# Patient Record
Sex: FEMALE | Race: WHITE | URBAN - METROPOLITAN AREA
[De-identification: names, ages, dates, MRNs, and addresses within clinical notes are randomized per-mention and may not be internally consistent; named-entity substitution may affect disease eponyms.]

---

## 2017-08-31 ENCOUNTER — OFFICE VISIT (OUTPATIENT)
Dept: DERMATOLOGY | Age: 67
End: 2017-08-31

## 2017-08-31 DIAGNOSIS — D22.9 MULTIPLE NEVI: ICD-10-CM

## 2017-08-31 DIAGNOSIS — Z85.828 HISTORY OF SKIN CANCER: Primary | ICD-10-CM

## 2017-08-31 DIAGNOSIS — L57.0 AK (ACTINIC KERATOSIS): ICD-10-CM

## 2017-08-31 DIAGNOSIS — L82.1 SEBORRHEIC KERATOSIS: ICD-10-CM

## 2017-08-31 PROCEDURE — G8421 BMI NOT CALCULATED: HCPCS | Performed by: DERMATOLOGY

## 2017-08-31 PROCEDURE — 17000 DESTRUCT PREMALG LESION: CPT | Performed by: DERMATOLOGY

## 2017-08-31 PROCEDURE — 4040F PNEUMOC VAC/ADMIN/RCVD: CPT | Performed by: DERMATOLOGY

## 2017-08-31 PROCEDURE — 3014F SCREEN MAMMO DOC REV: CPT | Performed by: DERMATOLOGY

## 2017-08-31 PROCEDURE — 1090F PRES/ABSN URINE INCON ASSESS: CPT | Performed by: DERMATOLOGY

## 2017-08-31 PROCEDURE — 99213 OFFICE O/P EST LOW 20 MIN: CPT | Performed by: DERMATOLOGY

## 2017-08-31 PROCEDURE — G8400 PT W/DXA NO RESULTS DOC: HCPCS | Performed by: DERMATOLOGY

## 2017-08-31 PROCEDURE — 1123F ACP DISCUSS/DSCN MKR DOCD: CPT | Performed by: DERMATOLOGY

## 2017-08-31 PROCEDURE — 1036F TOBACCO NON-USER: CPT | Performed by: DERMATOLOGY

## 2017-08-31 PROCEDURE — G8427 DOCREV CUR MEDS BY ELIG CLIN: HCPCS | Performed by: DERMATOLOGY

## 2017-08-31 PROCEDURE — 3017F COLORECTAL CA SCREEN DOC REV: CPT | Performed by: DERMATOLOGY

## 2017-11-02 ENCOUNTER — HOSPITAL ENCOUNTER (OUTPATIENT)
Dept: ENDOSCOPY | Age: 67
Discharge: HOME | End: 2017-11-02
Attending: INTERNAL MEDICINE | Admitting: INTERNAL MEDICINE

## 2017-11-02 RX ORDER — SODIUM CHLORIDE 9 MG/ML
INJECTION, SOLUTION INTRAVENOUS CONTINUOUS
Status: DISCONTINUED | OUTPATIENT
Start: 2017-11-02 | End: 2017-11-03 | Stop reason: HOSPADM

## 2017-11-02 RX ORDER — SODIUM CHLORIDE 0.9 % (FLUSH) 0.9 %
10 SYRINGE (ML) INJECTION EVERY 12 HOURS SCHEDULED
Status: DISCONTINUED | OUTPATIENT
Start: 2017-11-02 | End: 2017-11-03 | Stop reason: HOSPADM

## 2017-11-02 RX ORDER — LIDOCAINE HYDROCHLORIDE 10 MG/ML
1 INJECTION, SOLUTION EPIDURAL; INFILTRATION; INTRACAUDAL; PERINEURAL
Status: ACTIVE | OUTPATIENT
Start: 2017-11-02 | End: 2017-11-02

## 2017-11-02 RX ORDER — ONDANSETRON 2 MG/ML
4 INJECTION INTRAMUSCULAR; INTRAVENOUS PRN
Status: DISCONTINUED | OUTPATIENT
Start: 2017-11-02 | End: 2017-11-03 | Stop reason: HOSPADM

## 2017-11-02 RX ORDER — SODIUM CHLORIDE 0.9 % (FLUSH) 0.9 %
10 SYRINGE (ML) INJECTION PRN
Status: DISCONTINUED | OUTPATIENT
Start: 2017-11-02 | End: 2017-11-03 | Stop reason: HOSPADM

## 2017-11-02 ASSESSMENT — ENCOUNTER SYMPTOMS: SHORTNESS OF BREATH: 0

## 2017-11-02 NOTE — ANESTHESIA PRE-OP
[address] [facility] Anesthesiology  Pre-Anesthesia Evaluation/Consultation       Name:  Regulo Fan                                         Age:  79 y.o. MRN:    [id number]           Procedure (Scheduled): COLONOSCOPY   Surgeon:     Dr. Logan Boyd MD     No Known Allergies  There is no problem list on file for this patient. No past medical history on file. No past surgical history on file. Social History   Substance Use Topics    Smoking status: Never Smoker    Smokeless tobacco: Never Used    Alcohol use Not on file       Prior to Admission medications    Medication Sig Start Date End Date Taking? Authorizing Provider   CARAC 0.5 % cream Apply to affected area daily x 4 weeks. Wash hands after use. 10/23/14   Linda Quezada MD   erythromycin [facility]) 5 MG/GM ophthalmic ointment  9/1/14   Historical Provider, MD       Current Outpatient Prescriptions   Medication Sig Dispense Refill    CARAC 0.5 % cream Apply to affected area daily x 4 weeks. Wash hands after use. 1 Tube 2    erythromycin (ROMYCIN) 5 MG/GM ophthalmic ointment        Current Facility-Administered Medications   Medication Dose Route Frequency Provider Last Rate Last Dose    0.9 % sodium chloride infusion   Intravenous Continuous Linda Shaftsbury, DO        sodium chloride flush 0.9 % injection 10 mL  10 mL Intravenous 2 times per day Linda Alto, DO        sodium chloride flush 0.9 % injection 10 mL  10 mL Intravenous PRN Linda Alto, DO        lidocaine PF 1 % injection 1 mL  1 mL Intradermal Once PRN Linda Alto, DO        ondansetron [facility] injection 4 mg  4 mg Intravenous PRN Linda Alto, DO           Vital Signs  (Current) There were no vitals filed for this visit.   (for past 48 hrs)  No Data Recorded  (last three values)   BP Readings from Last 3 Encounters:   04/30/13 143/84       CBC  No results found for: WBC, RBC, HGB, HCT, MCV, RDW, PLT    CMP  No results found for: NA, K, CL, CO2, BUN, CREATININE, GFRAA, AGRATIO, LABGLOM, GLUCOSE, PROT, CALCIUM, BILITOT, ALKPHOS, AST, ALT    BMP  No results found for: NA, K, CL, CO2, BUN, CREATININE, CALCIUM, GFRAA, LABGLOM, GLUCOSE    Coags   No results found for: PROTIME, INR, APTT    HCG (If Applicable) No results found for: PREGTESTUR, PREGSERUM, HCG, HCGQUANT     ABGs  No results found for: PHART, PO2ART, XEO6HNH, DOI2PLO, BEART, I0XQTIBK     Type & Screen (If Applicable)  No results found for: LABABO, LABRH      POCGlucose  No results for input(s): GLUCOSE in the last 72 hours. NPO Status  > 8 hours                       BMI  There is no height or weight on file to calculate BMI. There is no height or weight on file to calculate BMI. Additional Testing (Echo, Stress, ECG, PFTs, etc)        Anesthesia Evaluation  Patient summary reviewed and Nursing notes reviewed no history of anesthetic complications:   Airway: Mallampati: II  TM distance: >3 FB   Neck ROM: full  Mouth opening: > = 3 FB Dental:          Pulmonary:       (-) pneumonia, COPD, asthma, shortness of breath, recent URI and sleep apnea                           Cardiovascular:  Exercise tolerance: good (>4 METS),       (-) pacemaker, hypertension, valvular problems/murmurs, past MI, CAD, CABG/stent, dysrhythmias,  angina,  CHF, orthopnea, PND and  HICKMAN      Rhythm: regular  Rate: normal                    Neuro/Psych:      (-) seizures, neuromuscular disease, TIA, CVA, headaches and psychiatric history           GI/Hepatic/Renal:        (-) hiatal hernia, GERD, PUD, hepatitis, liver disease and bowel prep       Endo/Other:        (-) hypothyroidism, hyperthyroidism, blood dyscrasia, arthritis, no Type II DM, no Type I DM               Abdominal:           Vascular:                                      Anesthesia Plan      MAC     ASA 2       Induction: intravenous. Anesthetic plan and risks discussed with patient. Plan discussed with CRNA.                 DOS STAFF ADDENDUM:    Pt seen and examined, chart reviewed (including anesthesia, drug and allergy history). No interval changes to history and physical examination. Anesthetic plan, risks, benefits, alternatives, and personnel involved discussed with patient. Patient verbalized an understanding and agrees to proceed.       Ally Flores MD  November 2, 2017  9:15 AM

## 2018-09-11 ENCOUNTER — OFFICE VISIT (OUTPATIENT)
Dept: DERMATOLOGY | Age: 68
End: 2018-09-11

## 2018-09-11 DIAGNOSIS — L57.0 AK (ACTINIC KERATOSIS): ICD-10-CM

## 2018-09-11 DIAGNOSIS — L82.1 SEBORRHEIC KERATOSIS: ICD-10-CM

## 2018-09-11 DIAGNOSIS — D22.9 MULTIPLE NEVI: ICD-10-CM

## 2018-09-11 DIAGNOSIS — Z85.828 HISTORY OF NONMELANOMA SKIN CANCER: Primary | ICD-10-CM

## 2018-09-11 PROCEDURE — G8421 BMI NOT CALCULATED: HCPCS | Performed by: DERMATOLOGY

## 2018-09-11 PROCEDURE — 1090F PRES/ABSN URINE INCON ASSESS: CPT | Performed by: DERMATOLOGY

## 2018-09-11 PROCEDURE — 1123F ACP DISCUSS/DSCN MKR DOCD: CPT | Performed by: DERMATOLOGY

## 2018-09-11 PROCEDURE — 1036F TOBACCO NON-USER: CPT | Performed by: DERMATOLOGY

## 2018-09-11 PROCEDURE — 1101F PT FALLS ASSESS-DOCD LE1/YR: CPT | Performed by: DERMATOLOGY

## 2018-09-11 PROCEDURE — 17000 DESTRUCT PREMALG LESION: CPT | Performed by: DERMATOLOGY

## 2018-09-11 PROCEDURE — 3017F COLORECTAL CA SCREEN DOC REV: CPT | Performed by: DERMATOLOGY

## 2018-09-11 PROCEDURE — G8400 PT W/DXA NO RESULTS DOC: HCPCS | Performed by: DERMATOLOGY

## 2018-09-11 PROCEDURE — 99213 OFFICE O/P EST LOW 20 MIN: CPT | Performed by: DERMATOLOGY

## 2018-09-11 PROCEDURE — 17003 DESTRUCT PREMALG LES 2-14: CPT | Performed by: DERMATOLOGY

## 2018-09-11 PROCEDURE — 4040F PNEUMOC VAC/ADMIN/RCVD: CPT | Performed by: DERMATOLOGY

## 2018-09-11 PROCEDURE — G8427 DOCREV CUR MEDS BY ELIG CLIN: HCPCS | Performed by: DERMATOLOGY

## 2018-09-11 RX ORDER — LISINOPRIL 10 MG/1
10 TABLET ORAL
COMMUNITY
Start: 2018-02-12

## 2018-09-11 NOTE — PATIENT INSTRUCTIONS
Cryosurgery (Freezing) Wound Care Instructions    AFTER THE PROCEDURE:    You will notice swelling and redness around the site. This is normal.    You may experience a sharp or sore feeling for the next several days. For this discomfort, you may take acetaminophen (Tylenol©).  A blister may develop at the treated area, sometimes as soon as by the end of the day. After several days, the blister will subside and a scab will form.  If the area is bumped or traumatized during the first few days following freezing, you may develop bleeding into the blister, forming a blood blister. This is nothing to be alarmed about.  If the blister is tense, uncomfortable, or much larger than the site that was frozen, you may pop the blister along its edge with a sterile needle (boiled, heated under a flame, or cleaned with alcohol) to allow the fluid to drain out. If the blister does not bother you, no treatment is needed.  Do NOT peel off the top of the blister roof. It will act as a dressing on top of your wound. WOUND CARE:    You may shower or bathe as usual, but avoid scrubbing the areas that have been frozen.  Cleanse the site twice a day with mild soapy water, and then apply a thin film of white petrolatum (Vaseline©).  You do not need to cover the area, but can if you prefer.  Do NOT allow the site to become dry or crusted, or attempt to dry it out with rubbing alcohol or hydrogen peroxide.  Continue this regimen until the area is pink and healed. Depending on the size and location of your cryosurgery site, healing may take 2 to 4 weeks.  The area may continue to be pink for several weeks, and over the next few months may become darker or lighter than the surrounding skin. This may be a permanent change. Protecting Yourself From the Sun    · Apply broad spectrum water resistant sunscreen with an SPF of at least 30 to exposed areas of the skin. Dont forget the ears and lips!  Remember to reapply sunscreen about every 2 hours and after swimming or sweating. · Wear sun protective clothing. Swim shirts (aka. rash guards) are a great idea and negates the need to reapply sunscreen in those areas.      · Seek the shade whenever possible especially between the hours of 10 am and 4 pm when the suns rays are the strongest.     · Avoid tanning beds

## 2019-09-12 ENCOUNTER — OFFICE VISIT (OUTPATIENT)
Dept: DERMATOLOGY | Age: 69
End: 2019-09-12
Payer: MEDICARE

## 2019-09-12 DIAGNOSIS — L57.0 AK (ACTINIC KERATOSIS): ICD-10-CM

## 2019-09-12 DIAGNOSIS — D22.9 MULTIPLE NEVI: ICD-10-CM

## 2019-09-12 DIAGNOSIS — L82.1 SEBORRHEIC KERATOSIS: ICD-10-CM

## 2019-09-12 DIAGNOSIS — Z85.828 HISTORY OF NONMELANOMA SKIN CANCER: Primary | ICD-10-CM

## 2019-09-12 PROCEDURE — G8400 PT W/DXA NO RESULTS DOC: HCPCS | Performed by: DERMATOLOGY

## 2019-09-12 PROCEDURE — 3017F COLORECTAL CA SCREEN DOC REV: CPT | Performed by: DERMATOLOGY

## 2019-09-12 PROCEDURE — 17003 DESTRUCT PREMALG LES 2-14: CPT | Performed by: DERMATOLOGY

## 2019-09-12 PROCEDURE — 17000 DESTRUCT PREMALG LESION: CPT | Performed by: DERMATOLOGY

## 2019-09-12 PROCEDURE — 1036F TOBACCO NON-USER: CPT | Performed by: DERMATOLOGY

## 2019-09-12 PROCEDURE — 1123F ACP DISCUSS/DSCN MKR DOCD: CPT | Performed by: DERMATOLOGY

## 2019-09-12 PROCEDURE — G8421 BMI NOT CALCULATED: HCPCS | Performed by: DERMATOLOGY

## 2019-09-12 PROCEDURE — 99213 OFFICE O/P EST LOW 20 MIN: CPT | Performed by: DERMATOLOGY

## 2019-09-12 PROCEDURE — G8427 DOCREV CUR MEDS BY ELIG CLIN: HCPCS | Performed by: DERMATOLOGY

## 2019-09-12 PROCEDURE — 4040F PNEUMOC VAC/ADMIN/RCVD: CPT | Performed by: DERMATOLOGY

## 2019-09-12 PROCEDURE — 1090F PRES/ABSN URINE INCON ASSESS: CPT | Performed by: DERMATOLOGY

## 2019-09-12 NOTE — PROGRESS NOTES
The following were examined and determined to be abnormal: Head/face, LUE    Scar clear   L Nasal dorsum with subtle ill-defined erythematous roughly scaled macule   L upper outer arm with pale pink rough macule  trunk and extremities with scattered brown macules and papules    Assessment and Plan     1. Hx of NMSC, no signs recurrence  - educ sun protection   encouraged skin check yearly (sooner if indicated), self checks    2. Benign-appearing nevi and SK's  - ABCD's of MM   educ sun protection   encouraged skin check yearly (sooner if indicated), self checks    3. AK - L upper outer arm (pink/hypopigmented) and L nasal dorusm  - 2 lesion(s) on the FH treated with liquid nitrogen x 2 cycles. Patient educated on risk of blister, hypopigmentation/scar and wound care. - consider again efudex if persistent    F/u 1 year.

## 2020-08-27 ENCOUNTER — TELEPHONE (OUTPATIENT)
Dept: DERMATOLOGY | Age: 70
End: 2020-08-27

## 2020-08-27 NOTE — TELEPHONE ENCOUNTER
Dr Meme Sepulveda patient  Pt c/b #[phone]  Pt stated  Is scheduled to come on 9.17.20 but pt wants to reschedule appt for January of next year for the same day her  is scheduled for.  sees Prieto Marquez.   Please c/b to discuss

## 2021-04-13 ENCOUNTER — OFFICE VISIT (OUTPATIENT)
Dept: DERMATOLOGY | Age: 71
End: 2021-04-13
Payer: MEDICARE

## 2021-04-13 VITALS — TEMPERATURE: 98.8 F

## 2021-04-13 DIAGNOSIS — D48.5 NEOPLASM OF UNCERTAIN BEHAVIOR OF SKIN: ICD-10-CM

## 2021-04-13 DIAGNOSIS — Z85.828 HISTORY OF NONMELANOMA SKIN CANCER: Primary | ICD-10-CM

## 2021-04-13 DIAGNOSIS — D22.9 MULTIPLE NEVI: ICD-10-CM

## 2021-04-13 DIAGNOSIS — L57.0 AK (ACTINIC KERATOSIS): ICD-10-CM

## 2021-04-13 DIAGNOSIS — L82.1 SEBORRHEIC KERATOSIS: ICD-10-CM

## 2021-04-13 PROCEDURE — G8427 DOCREV CUR MEDS BY ELIG CLIN: HCPCS | Performed by: DERMATOLOGY

## 2021-04-13 PROCEDURE — 4040F PNEUMOC VAC/ADMIN/RCVD: CPT | Performed by: DERMATOLOGY

## 2021-04-13 PROCEDURE — G8421 BMI NOT CALCULATED: HCPCS | Performed by: DERMATOLOGY

## 2021-04-13 PROCEDURE — 1090F PRES/ABSN URINE INCON ASSESS: CPT | Performed by: DERMATOLOGY

## 2021-04-13 PROCEDURE — 1123F ACP DISCUSS/DSCN MKR DOCD: CPT | Performed by: DERMATOLOGY

## 2021-04-13 PROCEDURE — 3017F COLORECTAL CA SCREEN DOC REV: CPT | Performed by: DERMATOLOGY

## 2021-04-13 PROCEDURE — G8400 PT W/DXA NO RESULTS DOC: HCPCS | Performed by: DERMATOLOGY

## 2021-04-13 PROCEDURE — 17000 DESTRUCT PREMALG LESION: CPT | Performed by: DERMATOLOGY

## 2021-04-13 PROCEDURE — 11102 TANGNTL BX SKIN SINGLE LES: CPT | Performed by: DERMATOLOGY

## 2021-04-13 PROCEDURE — 11103 TANGNTL BX SKIN EA SEP/ADDL: CPT | Performed by: DERMATOLOGY

## 2021-04-13 PROCEDURE — 1036F TOBACCO NON-USER: CPT | Performed by: DERMATOLOGY

## 2021-04-13 PROCEDURE — 99213 OFFICE O/P EST LOW 20 MIN: CPT | Performed by: DERMATOLOGY

## 2021-04-13 NOTE — PROGRESS NOTES
Replaced by [facility]  Logan Jeffries MD  [phone]      Judah Martinez  1950    79 y.o. female     Date of Visit: 4/13/2021    Chief Complaint: f/u skin cancer, moles  Chief Complaint   Patient presents with    Skin Exam     Last seen: 9-2019    History of Present Illness:    1. Here for skin check. She has a hx of NMSC (BCC on the FH) - s/p Mohs by Dr. Zee Nunn a few years ago. 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 Treated with carac x 4 weeks and then smooth. Remains Asx. No new bleeding or non-healing lesions. 2. She has a focal rough spot on the FH. Asx. No probs with previous sites of AK's.    3. She has scattered asx brown lesions on the trunk and extremities, present for many years; no change in size/shape/color of any lesions; no bleeding lesions. 4. She has a concerning brown lesion on the R upper outer arm and a concerning pink lesion on the R chest.  Asx. Sandra's mom. Alycia Cote has 3 boys - Augustine Dumont in 2011, Shantal Chanel in 2013 and Silvana in 2016). *son in [place] as well    Review of Systems:  Gen: Feels well, good sense of health. Skin: No changing moles or lesions. History reviewed. No pertinent past medical history. History reviewed. No pertinent surgical history. Outpatient Medications Marked as Taking for the 4/13/21 encounter (Office Visit) with Marga Husain MD   Medication Sig Dispense Refill    lisinopril (PRINIVIL;ZESTRIL) 10 MG tablet Take 10 mg by mouth      erythromycin (ROMYCIN) 5 MG/GM ophthalmic ointment          No Known Allergies      Past Medical History, Family History, Surgical History, Medications and Allergies reviewed.     Physical Examination     Gen, NAD  The following were examined and determined to be normal: Psych/Neuro, Scalp/hair, Conjunctivae/eyelids, Neck, Breast/axilla/chest, Abdomen, Back, RUE, RLE, Nails/digits and Groin/buttocks. LLE and lip. The following were examined and determined to be abnormal: Head/face, LUE    Scar clear   R FH with erythematous roughly scaled macule   L upper outer arm with few small bright pink macules  trunk and extremities with scattered brown macules and papules  R upper outer arm - irregular brown macule  R chest - scaled erythematous papule    Assessment and Plan     1. Hx of NMSC, no signs recurrence  - educ sun protection   encouraged skin check yearly (sooner if indicated), self checks    2. AK - R FH  - 1 lesion(s) on the FH treated with liquid nitrogen x 2 cycles. Patient educated on risk of blister, hypopigmentation/scar and wound care. *Monitor - L upper outer arm (pink/hypopigmented) - LN2 in 2019    3. Benign-appearing nevi and SK's  - educ re si/sx/ABCD's of MM   educ sun protection - OTC sunscreen with SPF 30-50+ recommended and reviewed usage  encouraged skin check yearly (sooner if indicated), self checks    4. R upper outer arm - r/o lentigo vs dysplastic  R chest - r/o LK vs BCC  - 2 Shave biopsy performed after verbal consent obtained. Patient educated regarding risk of bleeding, infection, scar and educated on wound care. Skin cleansed with alcohol pad and site anesthetized with lido + epi. Aluminum chloride applied to site for hemostasis. Petrolatum ointment and bandage applied. Specimen bottle labeled with patient information and site and specimen sent to [facility]. F/u 1 year.

## 2021-04-13 NOTE — PATIENT INSTRUCTIONS
Biopsy Wound Care Instructions    · Keep the bandage in place for 24 hours. · Cleanse the wound with mild soapy water daily   Gently dry the area.  Apply Vaseline or petroleum jelly to the wound using a cotton tipped applicator.  Cover with a clean bandage.  Repeat this process until the biopsy site is healed.  If you had stitches placed, continue treating the site until the stitches are removed. Remember to make an appointment to return to have your stitches removed by our staff.  You may shower and bathe as usual.       ** Biopsy results generally take around 7 business days to come back. If you have not heard from us by then, please call the office at [phone]. *Please note that biopsy results are released to both the patient and physician at the same time in [address] Please allow time for your physician to review the results. One of our staff members will reach out to you with the results and plan. Protecting Yourself From the Sun    · Apply an over-the-counter broad spectrum water resistant sunscreen with an SPF of at least 30 to exposed areas of the skin. Dont forget the ears and lips! Remember to reapply sunscreen about every 2 hours and after swimming or sweating. · Wear sun protective clothing. Swim shirts (aka. rash guards) are a great idea and negates the need to reapply sunscreen in those areas.      · Seek the shade whenever possible especially between the hours of 10 am and 4 pm when the suns rays are the strongest.     · Avoid tanning beds  ·

## 2021-04-15 LAB — DERMATOLOGY PATHOLOGY REPORT: NORMAL

## 2022-09-06 ENCOUNTER — OFFICE VISIT (OUTPATIENT)
Dept: DERMATOLOGY | Age: 72
End: 2022-09-06
Payer: MEDICARE

## 2022-09-06 DIAGNOSIS — Z85.828 HISTORY OF NONMELANOMA SKIN CANCER: Primary | ICD-10-CM

## 2022-09-06 DIAGNOSIS — L57.0 AK (ACTINIC KERATOSIS): ICD-10-CM

## 2022-09-06 DIAGNOSIS — L82.1 SEBORRHEIC KERATOSIS: ICD-10-CM

## 2022-09-06 DIAGNOSIS — D22.9 MULTIPLE NEVI: ICD-10-CM

## 2022-09-06 DIAGNOSIS — L81.4 LENTIGINES: ICD-10-CM

## 2022-09-06 PROCEDURE — 17000 DESTRUCT PREMALG LESION: CPT | Performed by: DERMATOLOGY

## 2022-09-06 PROCEDURE — G8421 BMI NOT CALCULATED: HCPCS | Performed by: DERMATOLOGY

## 2022-09-06 PROCEDURE — 99213 OFFICE O/P EST LOW 20 MIN: CPT | Performed by: DERMATOLOGY

## 2022-09-06 PROCEDURE — 1123F ACP DISCUSS/DSCN MKR DOCD: CPT | Performed by: DERMATOLOGY

## 2022-09-06 PROCEDURE — 3017F COLORECTAL CA SCREEN DOC REV: CPT | Performed by: DERMATOLOGY

## 2022-09-06 PROCEDURE — G8427 DOCREV CUR MEDS BY ELIG CLIN: HCPCS | Performed by: DERMATOLOGY

## 2022-09-06 PROCEDURE — G8400 PT W/DXA NO RESULTS DOC: HCPCS | Performed by: DERMATOLOGY

## 2022-09-06 PROCEDURE — 1036F TOBACCO NON-USER: CPT | Performed by: DERMATOLOGY

## 2022-09-06 PROCEDURE — 1090F PRES/ABSN URINE INCON ASSESS: CPT | Performed by: DERMATOLOGY

## 2022-09-06 RX ORDER — MULTIVIT-MIN/IRON/FOLIC ACID/K 18-600-40
CAPSULE ORAL
COMMUNITY

## 2022-09-06 NOTE — PROGRESS NOTES
[facility]  Mary Cantor MD  [phone]      Jacek [facility]  1950    70 y.o. female     Date of Visit: 9/6/2022    Chief Complaint: f/u skin cancer, moles  Chief Complaint   Patient presents with    Skin Exam     FSE      HX:NMSC     Last seen: 4-2021    History of Present Illness:    1. Here for skin check. She has a hx of NMSC (BCC on the FH) - s/p Mohs by Dr. Vishal Aguirre several years ago. 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 Treated with carac x 4 weeks and then smooth. Remains Asx. No new bleeding or non-healing lesions. 2. She has a focal rough spot on the L chest.  Asx. No probs with previous sites of AK's.    3. She has scattered asx brown lesions on the trunk and extremities, present for many years; no change in size/shape/color of any lesions; no bleeding lesions. Sandra's mom. Eloina George has 3 boys - Georgette Garcia in 2011, Yarely Ponce in 2013 and Silvana in 2016). *son in [place] as well    Review of Systems:  Gen: Feels well, good sense of health. Skin: No changing moles or lesions. No past medical history on file. No past surgical history on file. Outpatient Medications Marked as Taking for the 9/6/22 encounter (Office Visit) with Sameera Smith MD   Medication Sig Dispense Refill    Cholecalciferol (VITAMIN D) 50 MCG (2000 UT) CAPS capsule Take by mouth      lisinopril (PRINIVIL;ZESTRIL) 10 MG tablet Take 10 mg by mouth      erythromycin (ROMYCIN) 5 MG/GM ophthalmic ointment          No Known Allergies      Past Medical History, Family History, Surgical History, Medications and Allergies reviewed.     Physical Examination     Gen, NAD  FSE today    Scar clear   L chest with erythematous roughly scaled macule   trunk and extremities with scattered brown macules and papules    Assessment and Plan     1. Hx of NMSC, no signs recurrence  - educ sun protection   encouraged skin check yearly (sooner if indicated), self checks    2. AK - L chest  - 1 -  lesion(s) treated with liquid nitrogen with cryac or swab. Treated with 2 cycles for 1-5 seconds each after consent from patient. Patient educated on risk of blister, hypopigmentation/scar and wound care. Tolerated well. 3. Benign-appearing nevi and SK's + lentigines  - Monitor for ABCD's of MM and si/sx of NMSC  Continue sun protection - OTC sunscreen with SPF 30-50+ recommended and reviewed usage  Encouraged skin check yearly (sooner if indicated), self checks    R upper outer arm - solar lentigo   R chest - ISK    F/u 1 year.

## 2023-11-16 ENCOUNTER — OFFICE VISIT (OUTPATIENT)
Dept: DERMATOLOGY | Age: 73
End: 2023-11-16

## 2023-11-16 DIAGNOSIS — L57.0 AK (ACTINIC KERATOSIS): ICD-10-CM

## 2023-11-16 DIAGNOSIS — L82.1 SEBORRHEIC KERATOSIS: ICD-10-CM

## 2023-11-16 DIAGNOSIS — D22.9 MULTIPLE NEVI: ICD-10-CM

## 2023-11-16 DIAGNOSIS — Z85.828 HISTORY OF NONMELANOMA SKIN CANCER: Primary | ICD-10-CM

## 2023-11-16 DIAGNOSIS — L81.4 LENTIGINES: ICD-10-CM

## 2023-11-16 RX ORDER — ROSUVASTATIN CALCIUM 10 MG/1
10 TABLET, COATED ORAL EVERY EVENING
COMMUNITY
Start: 2023-06-01 | End: 2024-02-26

## 2023-11-16 NOTE — PROGRESS NOTES
[facility]  Terri Sandoval MD  [phone]      Slim Allen  1950    68 y.o. female     Date of Visit: 2023    Chief Complaint: f/u skin cancer, moles  Chief Complaint   Patient presents with    Skin Exam     FSE    SK    HX:NMSC     Last seen:     History of Present Illness:    1. Here for skin check. She has a hx of NMSC (BCC on the FH) - s/p Mohs by Dr. Brandi Carballo several years ago. 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 Treated with carac x 4 weeks and then healed well - remains smooth. Remains Asx. No new bleeding or non-healing lesions. 2. She has a focal rough spot on the nasal dorsum. Asx and she was not aware of it. No probs with previous sites of AK's.    3. She has scattered asx brown lesions on the trunk and extremities, present for many years; no change in size/shape/color of any lesions; no bleeding lesions. Sandra's mom. Rhianna Mei has 3 boys - Franci Weller in 2011, [facility] in 2013 and [facility] in 2016). *son in [place] as well    Review of Systems:  Gen: Feels well, good sense of health. Skin: No changing moles or lesions. History reviewed. No pertinent past medical history. History reviewed. No pertinent surgical history.     Outpatient Medications Marked as Taking for the 23 encounter (Office Visit) with Moosbrugger, Minerva Aase, MD   Medication Sig Dispense Refill    rosuvastatin (CRESTOR) 10 MG tablet Take 1 tablet by mouth every evening      Cholecalciferol (VITAMIN D) 50 MCG (2000) CAPS capsule Take by mouth      lisinopril (PRINIVIL;ZESTRIL) 10 MG tablet Take 1 tablet by mouth      erythromycin (ROMYCIN) 5 MG/GM ophthalmic ointment          No Known Allergies      Past Medical History, Family History, Surgical History, Medications and

## 2024-11-19 ENCOUNTER — OFFICE VISIT (OUTPATIENT)
Dept: DERMATOLOGY | Age: 74
End: 2024-11-19

## 2024-11-19 DIAGNOSIS — D48.5 NEOPLASM OF UNCERTAIN BEHAVIOR OF SKIN: ICD-10-CM

## 2024-11-19 DIAGNOSIS — Z85.828 HISTORY OF NONMELANOMA SKIN CANCER: Primary | ICD-10-CM

## 2024-11-19 DIAGNOSIS — L81.4 LENTIGINES: ICD-10-CM

## 2024-11-19 DIAGNOSIS — L82.1 SEBORRHEIC KERATOSIS: ICD-10-CM

## 2024-11-19 DIAGNOSIS — L57.0 AK (ACTINIC KERATOSIS): ICD-10-CM

## 2024-11-19 DIAGNOSIS — D22.9 MULTIPLE NEVI: ICD-10-CM

## 2024-11-19 RX ORDER — ASPIRIN 81 MG/1
81 TABLET ORAL DAILY
COMMUNITY

## 2024-11-19 NOTE — PATIENT INSTRUCTIONS
Protecting Yourself From the Sun    Apply an over-the-counter broad spectrum water resistant sunscreen with an SPF of at least 30 to exposed areas of the skin. Don’t forget the ears and lips! Remember to reapply sunscreen about every 2 hours and after swimming or sweating.     Wear sun protective clothing. Swim shirts (aka. rash guards) are a great idea and negates the need to reapply sunscreen in those areas.     Seek the shade whenever possible especially between the hours of 10 am and 4 pm when the sun’s rays are the strongest.     Avoid tanning beds       Biopsy Wound Care Instructions    Keep the bandage in place for 24 hours.   Cleanse the wound with mild soapy water daily  Gently dry the area.  Apply Vaseline or petroleum jelly to the wound using a cotton tipped applicator.  Cover with a clean bandage.  Repeat this process until the biopsy site is healed.  If you had stitches placed, continue treating the site until the stitches are removed. Remember to make an appointment to return to have your stitches removed by our staff.  You may shower and bathe as usual.       ** Biopsy results generally take around 7 business days to come back.  If you have not heard from us by then, please call the office at [phone].    *Please note that biopsy results are released to both the patient and physician at the same time in [facility].  Please allow time for your physician to review the results.  One of our staff members will reach out to you with the results and plan.

## 2024-11-19 NOTE — PROGRESS NOTES
[facility]  Bhavani Noriega MD  [phone]      Porsha Reddy  1950    74 y.o. female     Date of Visit: 11/19/2024    Chief Complaint: f/u skin cancer, moles  Chief Complaint   Patient presents with    Skin Exam     Last seen:   *went to [place] in 2024    History of Present Illness:    1. Here for skin check.  She has a hx of NMSC (BCC on the FH) - s/p Mohs by Dr. Pena ~2012.  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  Treated with carac x 4 weeks and then healed well - remains smooth.  Remains Asx.   No new bleeding or non-healing lesions.    2. She has a focal rough spot on the upper FH, just above Mohs scar.  Asx. No probs with previous sites of AK's.    3. She has scattered asx brown lesions on the trunk and extremities, present for many years; no change in size/shape/color of any lesions; no bleeding lesions.    4.  She has a concerning pink lesion on the right knee.  Unaware if it herself so not sure of duration and is asymptomatic.    Sandra's mom.  (Sandra has 3 boys - Joaquin in 2011, Shaka in 2013 and Michoacano in 2016).  *son in [place] as well    Review of Systems:  Gen: Feels well, good sense of health.  Skin: No changing moles or lesions.    History reviewed. No pertinent past medical history.    No past surgical history on file.    Outpatient Medications Marked as Taking for the 11/19/24 encounter (Office Visit) with Bhavani Noriega MD   Medication Sig Dispense Refill    aspirin 81 MG EC tablet Take 1 tablet by mouth daily      rosuvastatin (CRESTOR) 10 MG tablet Take 1 tablet by mouth every evening      Cholecalciferol (VITAMIN D) 50 MCG (2000 UT) CAPS capsule Take by mouth      lisinopril (PRINIVIL;ZESTRIL) 10 MG tablet Take 1 tablet by mouth      erythromycin

## 2024-11-22 LAB — DERMATOLOGY PATHOLOGY REPORT: NORMAL
